# Patient Record
Sex: MALE | Race: BLACK OR AFRICAN AMERICAN | Employment: FULL TIME | URBAN - METROPOLITAN AREA
[De-identification: names, ages, dates, MRNs, and addresses within clinical notes are randomized per-mention and may not be internally consistent; named-entity substitution may affect disease eponyms.]

---

## 2021-07-04 ENCOUNTER — HOSPITAL ENCOUNTER (EMERGENCY)
Age: 31
Discharge: HOME | End: 2021-07-04
Attending: FAMILY MEDICINE

## 2021-07-04 VITALS
OXYGEN SATURATION: 98 % | WEIGHT: 176 LBS | TEMPERATURE: 98.7 F | DIASTOLIC BLOOD PRESSURE: 96 MMHG | HEIGHT: 69 IN | HEART RATE: 97 BPM | BODY MASS INDEX: 26.07 KG/M2 | SYSTOLIC BLOOD PRESSURE: 125 MMHG | RESPIRATION RATE: 16 BRPM

## 2021-07-04 DIAGNOSIS — T78.40XA ALLERGIC REACTION, INITIAL ENCOUNTER: Primary | ICD-10-CM

## 2021-07-04 PROCEDURE — 99282 EMERGENCY DEPT VISIT SF MDM: CPT

## 2021-07-04 RX ORDER — HYDROXYZINE 25 MG/1
25 TABLET, FILM COATED ORAL
Qty: 20 TABLET | Refills: 0 | Status: SHIPPED | OUTPATIENT
Start: 2021-07-04 | End: 2021-07-14

## 2021-07-04 RX ORDER — METHYLPREDNISOLONE 4 MG/1
TABLET ORAL
Qty: 1 DOSE PACK | Refills: 0 | Status: SHIPPED | OUTPATIENT
Start: 2021-07-04

## 2021-07-04 NOTE — ED TRIAGE NOTES
Pt c/o having intermittent allergic reactions for one month. Broke out again last night. Went to see a provider several weeks ago. Pt was given steroids and hydroxyzine. States that he is out of the meds. Requesting refills.

## 2021-07-04 NOTE — ED PROVIDER NOTES
EMERGENCY DEPARTMENT HISTORY AND PHYSICAL EXAM      Date: 7/4/2021  Patient Name: Swati Mays    History of Presenting Illness     Chief Complaint   Patient presents with    Allergic Reaction       History Provided By: Patient    HPI: Swati Mays, 32 y.o. male presents to the ED with cc of allergic reaction. Developed 1 months month ago and have been on and off since. He has been seen by 3 different ERs and was treated with prednisone and hydroxyzine because those are the only medications that resolved his allergic reaction. He has not follow-up with the allergist yet but plans to in the near future. His symptoms were resolved with treatment but he ran out of the medication and return today for a refill. He states that there is a few rash that developed on the extremities and itching all over but no swelling of the tongue or difficulty swallowing. No chest pain, palpitations, dyspnea. There are no other complaints, changes, or physical findings at this time. PCP: Dimas, MD Rohit    No current facility-administered medications on file prior to encounter. No current outpatient medications on file prior to encounter. Past History     Past Medical History:  History reviewed. No pertinent past medical history. Past Surgical History:  History reviewed. No pertinent surgical history. Family History:  History reviewed. No pertinent family history. Social History:  Social History     Tobacco Use    Smoking status: Current Every Day Smoker    Smokeless tobacco: Never Used   Substance Use Topics    Alcohol use: Not on file    Drug use: Not on file       Allergies:  No Known Allergies      Review of Systems     Review of Systems   Constitutional: Negative for chills and fever. HENT: Negative for congestion and sore throat. Eyes: Negative for photophobia and visual disturbance. Respiratory: Negative for cough and shortness of breath.     Cardiovascular: Negative for chest pain and palpitations. Gastrointestinal: Negative for nausea and vomiting. Genitourinary: Negative for dysuria and flank pain. Musculoskeletal: Negative for arthralgias, back pain and myalgias. Skin: Positive for rash. Negative for wound. Allergic/Immunologic: Negative for environmental allergies and food allergies. Neurological: Negative for light-headedness and headaches. All other systems reviewed and are negative. Physical Exam     Physical Exam  Vitals and nursing note reviewed. Constitutional:       Appearance: Normal appearance. He is normal weight. HENT:      Head: Normocephalic and atraumatic. Mouth/Throat:      Mouth: Mucous membranes are moist.      Pharynx: Oropharynx is clear. Comments: Mallampati score 1  Eyes:      Extraocular Movements: Extraocular movements intact. Conjunctiva/sclera: Conjunctivae normal.   Cardiovascular:      Rate and Rhythm: Normal rate and regular rhythm. Pulses: Normal pulses. Heart sounds: Normal heart sounds. Pulmonary:      Effort: Pulmonary effort is normal.      Breath sounds: Normal breath sounds. Musculoskeletal:         General: No swelling. Normal range of motion. Skin:     General: Skin is warm. Capillary Refill: Capillary refill takes less than 2 seconds. Findings: Rash present. Rash is urticarial (one found on right lower leg). Comments: itching   Neurological:      General: No focal deficit present. Mental Status: He is alert and oriented to person, place, and time. Psychiatric:         Mood and Affect: Mood normal.         Behavior: Behavior normal.         Thought Content: Thought content normal.         Judgment: Judgment normal.         Lab and Diagnostic Study Results     Labs -   No results found for this or any previous visit (from the past 12 hour(s)).     Radiologic Studies -   @lastxrresult@  CT Results  (Last 48 hours)    None        CXR Results  (Last 48 hours)    None            Medical Decision Making   - I am the first provider for this patient. - I reviewed the vital signs, available nursing notes, past medical history, past surgical history, family history and social history. - Initial assessment performed. The patients presenting problems have been discussed, and they are in agreement with the care plan formulated and outlined with them. I have encouraged them to ask questions as they arise throughout their visit. Vital Signs-Reviewed the patient's vital signs. Patient Vitals for the past 12 hrs:   Temp Pulse Resp BP SpO2   07/04/21 1431 98.7 °F (37.1 °C) 97 16 (!) 125/96 98 %       Records Reviewed: Nursing Notes    ED Course:     ED Course as of Jul 04 1519   Sun Jul 04, 2021   1455 Patient to go to the car to get last ED discharge papers because he cannot remember the name and dose of the medications. [MS]      ED Course User Index  [MS] Cassie Garces,        Provider Notes (Medical Decision Making):     MDM  Number of Diagnoses or Management Options  Risk of Complications, Morbidity, and/or Mortality  General comments: 3:17 PM  Patient is stable with no marked toxicity or distress. No significant findings on physical exam.  Strongly recommended to follow-up with allergist as soon as possible. Explained the consequences of constantly exposed to an allergen, that this may get worse with each exposure. all questions were answered and there are no apparent barriers to comprehension or communication. The patient verbalized agreement with the diagnosis, treatment plan, and understanding of the follow-up instructions. The patient is appropriate for discharge; leaves the Emergency Department walking with a stable gait. Patient understands to return to the ED for any new or worsening symptoms. Disposition   Disposition: Condition stable  DC- Adult Discharges: All of the diagnostic tests were reviewed and questions answered.  Diagnosis, care plan and treatment options were discussed. The patient understands the instructions and will follow up as directed. The patients results have been reviewed with them. They have been counseled regarding their diagnosis. The patient verbally convey understanding and agreement of the signs, symptoms, diagnosis, treatment and prognosis and additionally agrees to follow up as recommended with their PCP in 24 - 48 hours. They also agree with the care-plan and convey that all of their questions have been answered. I have also put together some discharge instructions for them that include: 1) educational information regarding their diagnosis, 2) how to care for their diagnosis at home, as well a 3) list of reasons why they would want to return to the ED prior to their follow-up appointment, should their condition change. Discharged    DISCHARGE PLAN:  1. There are no discharge medications for this patient. 2.   Follow-up Information     Follow up With Specialties Details Why [id number] [facility]  Schedule an appointment as soon as possible for a visit in 3 days  [address]  [phone]        3. Return to ED if worse   4. Current Discharge Medication List      START taking these medications    Details   hydrOXYzine HCL (ATARAX) 25 mg tablet Take 1 Tablet by mouth every six (6) hours as needed for Itching for up to 10 days. Qty: 20 Tablet, Refills: 0  Start date: 7/4/2021, End date: 7/14/2021      methylPREDNISolone (Medrol, Jose,) 4 mg tablet Take as directed  Qty: 1 Dose Pack, Refills: 0  Start date: 7/4/2021               Diagnosis     Clinical Impression:   1. Allergic reaction, initial encounter        Attestations:    Magi Pate, DO    Please note that this dictation was completed with [employer], the [employer] voice recognition software.   Quite often unanticipated grammatical, syntax, homophones, and other interpretive errors are inadvertently transcribed by the computer software. Please disregard these errors. Please excuse any errors that have escaped final proofreading. Thank you.

## 2021-10-19 ENCOUNTER — HOSPITAL ENCOUNTER (EMERGENCY)
Age: 31
Discharge: HOME | End: 2021-10-20
Attending: FAMILY MEDICINE

## 2021-10-19 VITALS
DIASTOLIC BLOOD PRESSURE: 82 MMHG | RESPIRATION RATE: 18 BRPM | SYSTOLIC BLOOD PRESSURE: 144 MMHG | BODY MASS INDEX: 24.73 KG/M2 | TEMPERATURE: 97.6 F | HEIGHT: 69 IN | OXYGEN SATURATION: 99 % | WEIGHT: 167 LBS | HEART RATE: 91 BPM

## 2021-10-19 DIAGNOSIS — S93.402A SPRAIN OF LEFT ANKLE, UNSPECIFIED LIGAMENT, INITIAL ENCOUNTER: Primary | ICD-10-CM

## 2021-10-19 PROCEDURE — 99283 EMERGENCY DEPT VISIT LOW MDM: CPT

## 2021-10-19 PROCEDURE — 74011250636 HC RX REV CODE- 250/636: Performed by: FAMILY MEDICINE

## 2021-10-19 PROCEDURE — 96372 THER/PROPH/DIAG INJ SC/IM: CPT

## 2021-10-19 RX ORDER — KETOROLAC TROMETHAMINE 30 MG/ML
30 INJECTION, SOLUTION INTRAMUSCULAR; INTRAVENOUS ONCE
Status: COMPLETED | OUTPATIENT
Start: 2021-10-20 | End: 2021-10-19

## 2021-10-19 RX ADMIN — KETOROLAC TROMETHAMINE 30 MG: 30 INJECTION, SOLUTION INTRAMUSCULAR at 23:52

## 2021-10-19 NOTE — LETTER
NOTIFICATION RETURN TO WORK / SCHOOL    10/20/2021 12:15 AM    Mr. Yao Murphy  [address] [address]      To Whom It May Concern:    Yao Murphy is currently under the care of [address] [place] [address]. He will return to work/school on: 10/21/2021    Emma Aliceaescobar Joya may return to work/school with the following restrictions: n/a      If there are questions or concerns please have the patient contact our office.         Sincerely,      Michelle Viera RN

## 2021-10-20 ENCOUNTER — APPOINTMENT (OUTPATIENT)
Dept: GENERAL RADIOLOGY | Age: 31
End: 2021-10-20
Attending: FAMILY MEDICINE

## 2021-10-20 PROCEDURE — 73610 X-RAY EXAM OF ANKLE: CPT

## 2021-10-20 RX ORDER — IBUPROFEN 800 MG/1
800 TABLET ORAL
Qty: 20 TABLET | Refills: 0 | Status: SHIPPED | OUTPATIENT
Start: 2021-10-20 | End: 2021-10-27

## 2021-10-20 NOTE — ED PROVIDER NOTES
EMERGENCY DEPARTMENT HISTORY AND PHYSICAL EXAM      Date: 10/19/2021  Patient Name: Karen Young    History of Presenting Illness     Chief Complaint   Patient presents with    Ankle Pain     left       History Provided By: Patient    HPI: Karen Young, 32 y.o. male presents to the ED with cc of left ankle. About 1 to 2 hours ago patient was at work when a motorized pallet hit the medial part of the left ankle. He sustained a few scrapes but there is pain on the medial part with applied pressure. Is able to ambulate without assistance. No prior history of sprains, dislocation, or fractures. Full range of motion sensation lower extremity. He sustained no other injuries. There are no other complaints, changes, or physical findings at this time. PCP: Other, MD Rohit    No current facility-administered medications on file prior to encounter. Current Outpatient Medications on File Prior to Encounter   Medication Sig Dispense Refill    methylPREDNISolone (Medrol, Jose,) 4 mg tablet Take as directed 1 Dose Pack 0       Past History     Past Medical History:  History reviewed. No pertinent past medical history. Past Surgical History:  History reviewed. No pertinent surgical history. Family History:  History reviewed. No pertinent family history. Social History:  Social History     Tobacco Use    Smoking status: Current Every Day Smoker     Packs/day: 1.00    Smokeless tobacco: Never Used   Substance Use Topics    Alcohol use: Not Currently     Comment: almost daily, 3 drinks.  Drug use: Never       Allergies:  No Known Allergies      Review of Systems     Review of Systems   Constitutional: Negative for chills and fever. HENT: Negative for congestion and sore throat. Eyes: Negative for photophobia and visual disturbance. Respiratory: Negative for cough and shortness of breath. Cardiovascular: Negative for chest pain and palpitations.    Gastrointestinal: Negative for nausea and vomiting. Genitourinary: Negative for dysuria and flank pain. Musculoskeletal: Positive for arthralgias, joint swelling and myalgias. Negative for back pain. Skin: Negative for rash and wound. Allergic/Immunologic: Negative for environmental allergies and food allergies. Neurological: Negative for light-headedness and headaches. All other systems reviewed and are negative. Physical Exam     Physical Exam  Vitals and nursing note reviewed. Constitutional:       Appearance: Normal appearance. He is normal weight. HENT:      Head: Normocephalic and atraumatic. Eyes:      Extraocular Movements: Extraocular movements intact. Conjunctiva/sclera: Conjunctivae normal.   Cardiovascular:      Rate and Rhythm: Normal rate and regular rhythm. Pulses: Normal pulses. Heart sounds: Normal heart sounds. Pulmonary:      Effort: Pulmonary effort is normal.      Breath sounds: Normal breath sounds. Musculoskeletal:         General: No swelling. Normal range of motion. Right ankle: Normal.      Left ankle: Swelling present. Tenderness present over the medial malleolus. Normal range of motion. Anterior drawer test negative. Normal pulse. Left Achilles Tendon: Normal.        Feet:       Comments: 2 small superficial scratches above the medial left ankle   Skin:     General: Skin is warm. Capillary Refill: Capillary refill takes less than 2 seconds. Neurological:      General: No focal deficit present. Mental Status: He is alert and oriented to person, place, and time. Psychiatric:         Mood and Affect: Mood normal.         Behavior: Behavior normal.         Thought Content: Thought content normal.         Judgment: Judgment normal.         Lab and Diagnostic Study Results     Labs -   No results found for this or any previous visit (from the past 12 hour(s)).     Radiologic Studies -   @lastxrresult@  CT Results  (Last 48 hours)    None        CXR Results  (Last 48 hours)    None            Medical Decision Making   - I am the first provider for this patient. - I reviewed the vital signs, available nursing notes, past medical history, past surgical history, family history and social history. - Initial assessment performed. The patients presenting problems have been discussed, and they are in agreement with the care plan formulated and outlined with them. I have encouraged them to ask questions as they arise throughout their visit. Vital Signs-Reviewed the patient's vital signs. Patient Vitals for the past 12 hrs:   Temp Pulse Resp BP SpO2   10/19/21 2338 97.6 °F (36.4 °C) 91 18 (!) 144/82 99 %       Records Reviewed: Nursing Notes    ED Course:          Provider Notes (Medical Decision Making):     MDM  Number of Diagnoses or Management Options  Sprain of left ankle, unspecified ligament, initial encounter  Diagnosis management comments: Differential diagnosis: fracture versus dislocation versus sprain    Risk of Complications, Morbidity, and/or Mortality  General comments: 12:13 AM  Patient is stable with no marked toxicity or distress. Place din an ankle brace with ace wrap and crutches. Recommended RICE method. I reviewed all radiographic results with the patient. All questions were answered and there are no apparent barriers to comprehension or communication. The patient verbalized agreement with the diagnosis, treatment plan, and understanding of the follow-up instructions. The patient is appropriate for discharge; leaves the Emergency Department walking with a stable gait. Patient understands to return to the ED for any new or worsening symptoms. Procedures   Medical Decision Makingedical Decision Making  Performed by: Colleen Shi DO  PROCEDURES:  Procedures       Disposition   Disposition: Condition stable  DC- Adult Discharges: All of the diagnostic tests were reviewed and questions answered.  Diagnosis, care plan and treatment options were discussed. The patient understands the instructions and will follow up as directed. The patients results have been reviewed with them. They have been counseled regarding their diagnosis. The patient verbally convey understanding and agreement of the signs, symptoms, diagnosis, treatment and prognosis and additionally agrees to follow up as recommended with their PCP in 24 - 48 hours. They also agree with the care-plan and convey that all of their questions have been answered. I have also put together some discharge instructions for them that include: 1) educational information regarding their diagnosis, 2) how to care for their diagnosis at home, as well a 3) list of reasons why they would want to return to the ED prior to their follow-up appointment, should their condition change. DISCHARGE PLAN:  1. Current Discharge Medication List      CONTINUE these medications which have NOT CHANGED    Details   methylPREDNISolone (Medrol, Jose,) 4 mg tablet Take as directed  Qty: 1 Dose Pack, Refills: 0           2. Follow-up Information     Follow up With Specialties Details Why Contact Info    Jovani Root MD Orthopedic Surgery  If symptoms worsen [facility] [address]  [phone]          3. Return to ED if worse   4. Current Discharge Medication List      START taking these medications    Details   ibuprofen (MOTRIN) 800 mg tablet Take 1 Tablet by mouth every six (6) hours as needed for Pain for up to 7 days. Qty: 20 Tablet, Refills: 0  Start date: 10/20/2021, End date: 10/27/2021               Diagnosis     Clinical Impression:   1. Sprain of left ankle, unspecified ligament, initial encounter        Attestations:    Abbi Wharton, DO    Please note that this dictation was completed with [employer], the computer voice recognition software.   Quite often unanticipated grammatical, syntax, homophones, and other interpretive errors are inadvertently transcribed by the computer software. Please disregard these errors. Please excuse any errors that have escaped final proofreading. Thank you.

## 2021-10-20 NOTE — ED TRIAGE NOTES
Pt reports his L ankle was hit by a motorized pallet at work last night. Pt unable to go into work today. Wants to make sure ankle is good and need a work note.

## 2023-05-14 RX ORDER — METHYLPREDNISOLONE 4 MG/1
TABLET ORAL
COMMUNITY
Start: 2021-07-04

## 2025-04-24 ENCOUNTER — APPOINTMENT (OUTPATIENT)
Facility: HOSPITAL | Age: 35
End: 2025-04-24
Payer: COMMERCIAL

## 2025-04-24 ENCOUNTER — HOSPITAL ENCOUNTER (EMERGENCY)
Facility: HOSPITAL | Age: 35
Discharge: HOME | End: 2025-04-24
Payer: COMMERCIAL

## 2025-04-24 VITALS
WEIGHT: 180 LBS | SYSTOLIC BLOOD PRESSURE: 151 MMHG | BODY MASS INDEX: 26.66 KG/M2 | HEART RATE: 65 BPM | OXYGEN SATURATION: 100 % | DIASTOLIC BLOOD PRESSURE: 85 MMHG | HEIGHT: 69 IN | TEMPERATURE: 97.9 F | RESPIRATION RATE: 18 BRPM

## 2025-04-24 DIAGNOSIS — M25.512 ACUTE PAIN OF LEFT SHOULDER: ICD-10-CM

## 2025-04-24 DIAGNOSIS — M19.012 OSTEOARTHRITIS OF LEFT SHOULDER, UNSPECIFIED OSTEOARTHRITIS TYPE: Primary | ICD-10-CM

## 2025-04-24 PROCEDURE — 73030 X-RAY EXAM OF SHOULDER: CPT

## 2025-04-24 PROCEDURE — 99284 EMERGENCY DEPT VISIT MOD MDM: CPT

## 2025-04-24 PROCEDURE — 6360000002 HC RX W HCPCS

## 2025-04-24 PROCEDURE — 6370000000 HC RX 637 (ALT 250 FOR IP)

## 2025-04-24 PROCEDURE — 96372 THER/PROPH/DIAG INJ SC/IM: CPT

## 2025-04-24 RX ORDER — PREDNISONE 20 MG/1
40 TABLET ORAL
Status: COMPLETED | OUTPATIENT
Start: 2025-04-24 | End: 2025-04-24

## 2025-04-24 RX ORDER — PREDNISONE 10 MG/1
TABLET ORAL
Qty: 1 EACH | Refills: 0 | Status: SHIPPED | OUTPATIENT
Start: 2025-04-24

## 2025-04-24 RX ORDER — OXYCODONE AND ACETAMINOPHEN 5; 325 MG/1; MG/1
1 TABLET ORAL
Refills: 0 | Status: COMPLETED | OUTPATIENT
Start: 2025-04-24 | End: 2025-04-24

## 2025-04-24 RX ORDER — KETOROLAC TROMETHAMINE 10 MG/1
10 TABLET, FILM COATED ORAL EVERY 6 HOURS PRN
Qty: 20 TABLET | Refills: 0 | Status: SHIPPED | OUTPATIENT
Start: 2025-04-24

## 2025-04-24 RX ORDER — KETOROLAC TROMETHAMINE 30 MG/ML
30 INJECTION, SOLUTION INTRAMUSCULAR; INTRAVENOUS
Status: COMPLETED | OUTPATIENT
Start: 2025-04-24 | End: 2025-04-24

## 2025-04-24 RX ORDER — OXYCODONE AND ACETAMINOPHEN 5; 325 MG/1; MG/1
1 TABLET ORAL EVERY 8 HOURS PRN
Qty: 6 TABLET | Refills: 0 | Status: SHIPPED | OUTPATIENT
Start: 2025-04-24 | End: 2025-04-26

## 2025-04-24 RX ADMIN — PREDNISONE 40 MG: 20 TABLET ORAL at 20:25

## 2025-04-24 RX ADMIN — KETOROLAC TROMETHAMINE 30 MG: 30 INJECTION, SOLUTION INTRAMUSCULAR at 20:25

## 2025-04-24 RX ADMIN — OXYCODONE AND ACETAMINOPHEN 1 TABLET: 325; 5 TABLET ORAL at 20:24

## 2025-04-24 ASSESSMENT — PAIN DESCRIPTION - DESCRIPTORS
DESCRIPTORS: ACHING
DESCRIPTORS: OTHER (COMMENT)

## 2025-04-24 ASSESSMENT — PAIN DESCRIPTION - ORIENTATION
ORIENTATION: LEFT
ORIENTATION: LEFT

## 2025-04-24 ASSESSMENT — PAIN SCALES - GENERAL
PAINLEVEL_OUTOF10: 7
PAINLEVEL_OUTOF10: 7

## 2025-04-24 ASSESSMENT — PAIN - FUNCTIONAL ASSESSMENT: PAIN_FUNCTIONAL_ASSESSMENT: ACTIVITIES ARE NOT PREVENTED

## 2025-04-24 ASSESSMENT — PAIN DESCRIPTION - LOCATION
LOCATION: SHOULDER
LOCATION: SHOULDER

## 2025-04-24 ASSESSMENT — PAIN DESCRIPTION - PAIN TYPE: TYPE: ACUTE PAIN

## 2025-04-25 NOTE — ED PROVIDER NOTES
[facility] EMERGENCY DEPT  EMERGENCY DEPARTMENT HISTORY AND PHYSICAL EXAM      Date of evaluation: 4/24/2025  Patient Name: Federico Johnson  Birthdate 1990  MRN: [id number]  ED Provider: LATRICE Jefferson NP   Note Started: 8:13 PM EDT 4/24/25    HISTORY OF PRESENT ILLNESS     Chief Complaint   Patient presents with    Shoulder Pain     Pt states he has a pain in his left shoulder that started this morning, has had issues with his shoulder in the past, feels like its 'grinding'  denies meds for pain PTA.        History Provided By: Patient, only     HPI: Federico Johnson is a 34 y.o. male who reports no past medical history, presents ambulatory into the emergency department with complaints of a \"grinding\" pain in his left shoulder that started this morning.  Reports rotator cuff injury several years ago, but no surgery or any recent injury.  Pain is worse with movement of the shoulder.  Denies neck pain, back pain, chest pain, arm weakness/paresthesias.  No pain medication taken prior to arrival.  Otherwise in his usual state of health. Upon arrival to the ED pt is alert and oriented x 3, well-appearing, and interacting appropriately; no obvious distress noted.      PAST MEDICAL HISTORY   Past Medical History:  No past medical history on file.    Past Surgical History:  No past surgical history on file.    Family History:  No family history on file.    Social History:  Social History     Tobacco Use    Smoking status: Every Day     Current packs/day: 1.00     Types: Cigarettes    Smokeless tobacco: Never   Substance Use Topics    Alcohol use: Not Currently    Drug use: Never       Allergies:  No Known Allergies    PCP: No primary care provider on file.    Current Meds:   No current facility-administered medications for this encounter.     Current Outpatient Medications   Medication Sig Dispense Refill    ketorolac (TORADOL) 10 MG tablet Take 1 tablet by mouth every 6 hours as needed for Pain 20 tablet 0    predniSONE  LATRICE Steele NP       Clinical Management Tools:  Not Applicable    Smoking Cessation: Not Applicable    Patient was given the following medications:  Medications   oxyCODONE-acetaminophen (PERCOCET) 5-325 MG per tablet 1 tablet (1 tablet Oral Given 4/24/25 2024)   ketorolac (TORADOL) injection 30 mg (30 mg IntraMUSCular Given 4/24/25 2025)   predniSONE (DELTASONE) tablet 40 mg (40 mg Oral Given 4/24/25 2025)       CONSULTS: See ED Course/MDM for further details.  None   PROCEDURES   Unless otherwise noted above, none  Procedures    SEPSIS REASSESSMENT & CRITICAL CARE TIME   SEPSIS REASSESSMENT: Patient does NOT meet Sepsis criteria after ED workup    Patient does not meet Critical Care Time, 0 minutes  CLINICAL IMPRESSIONS     1. Osteoarthritis of left shoulder, unspecified osteoarthritis type    2. Acute pain of left shoulder       SDOH/DISPOSITION/PLAN   Social Determinants affecting Treatment Plan: None    DISPOSITION Decision To Discharge 04/24/2025 09:10:39 PM   DISPOSITION CONDITION Stable         Discharge Note: The patient is stable for discharge home. The signs, symptoms, diagnosis, and discharge instructions have been discussed, understanding conveyed, and agreed upon. The patient is to follow up as recommended or return to ER should their symptoms worsen.      PATIENT REFERRED TO:  Alexei  [address]  [place] [address] [phone]  In 1 week  As needed        DISCHARGE MEDICATIONS:     Medication List        START taking these medications      ketorolac 10 MG tablet  Commonly known as: TORADOL  Take 1 tablet by mouth every 6 hours as needed for Pain     oxyCODONE-acetaminophen 5-325 MG per tablet  Commonly known as: Percocet  Take 1 tablet by mouth every 8 hours as needed for Pain for up to 2 days. Intended supply: 3 days. Take lowest dose possible to manage pain Max Daily Amount: 3 tablets     predniSONE 10 MG (21) Tbpk  Follow instructions on 6 day dose pack

## 2025-04-25 NOTE — DISCHARGE INSTRUCTIONS
Thank you for choosing our Emergency Department for your care.  It is our privilege to care for you in your time of need.  In the next several days, you may receive a survey via email or mailed to your home about your experience with our team.  We would greatly appreciate you taking a few minutes to complete the survey, as we use this information to learn what we have done well and what we could be doing better. Thank you for trusting us with your care!    Below you will find a list of your tests from today's visit.   Labs and Radiology Studies  No results found for this or any previous visit (from the past 12 hours).  XR SHOULDER LEFT (MIN 2 VIEWS)  Result Date: 4/24/2025  EXAM: XR SHOULDER LEFT (MIN 2 VIEWS) ACC#: [id number]  INDICATION: Pain, []  COMPARISON: None. . FINDINGS: Three views of the left shoulder demonstrate anatomic alignment and no acute fracture. There are mild degenerative joint changes along the glenohumeral joint. A small loose body is suspected. The included portion of the chest is clear.     No acute abnormality. Mild left shoulder degenerative joint disease. Electronically signed by Yan Sterling    ------------------------------------------------------------------------------------------------------------  The evaluation and treatment you received in the Emergency Department were for an urgent problem. It is important that you follow-up with a doctor, nurse practitioner, or physician assistant to:  (1) confirm your diagnosis,  (2) re-evaluation of changes in your illness and treatment, and (3) for ongoing care. Please take your discharge instructions with you when you go to your follow-up appointment.     If you have any problem arranging a follow-up appointment, contact us!  If your symptoms become worse or you do not improve as expected, please return to us. We are available 24 hours a day.     If a prescription has been provided, please fill it as soon as possible to prevent a delay in